# Patient Record
Sex: MALE | Race: WHITE
[De-identification: names, ages, dates, MRNs, and addresses within clinical notes are randomized per-mention and may not be internally consistent; named-entity substitution may affect disease eponyms.]

---

## 2021-08-19 ENCOUNTER — HOSPITAL ENCOUNTER (OUTPATIENT)
Dept: HOSPITAL 47 - LABWHC1 | Age: 61
Discharge: HOME | End: 2021-08-19
Attending: INTERNAL MEDICINE
Payer: COMMERCIAL

## 2021-08-19 DIAGNOSIS — E78.2: Primary | ICD-10-CM

## 2021-08-19 LAB
ALT SERPL-CCNC: 31 U/L (ref 10–49)
AST SERPL-CCNC: 63 U/L (ref 14–35)
CHOLEST SERPL-MCNC: 214 MG/DL (ref 0–200)
HDLC SERPL-MCNC: 79 MG/DL (ref 40–60)
LDLC SERPL CALC-MCNC: 116.4 MG/DL (ref 0–131)
TRIGL SERPL-MCNC: 93 MG/DL (ref 0–149)
VLDLC SERPL CALC-MCNC: 18.6 MG/DL (ref 5–40)

## 2021-08-19 PROCEDURE — 80061 LIPID PANEL: CPT

## 2021-08-19 PROCEDURE — 84460 ALANINE AMINO (ALT) (SGPT): CPT

## 2021-08-19 PROCEDURE — 36415 COLL VENOUS BLD VENIPUNCTURE: CPT

## 2021-08-19 PROCEDURE — 84450 TRANSFERASE (AST) (SGOT): CPT

## 2022-09-13 ENCOUNTER — HOSPITAL ENCOUNTER (INPATIENT)
Dept: HOSPITAL 47 - EC | Age: 62
LOS: 3 days | Discharge: HOME | DRG: 280 | End: 2022-09-16
Attending: HOSPITALIST | Admitting: HOSPITALIST
Payer: COMMERCIAL

## 2022-09-13 DIAGNOSIS — I47.2: ICD-10-CM

## 2022-09-13 DIAGNOSIS — Z95.1: ICD-10-CM

## 2022-09-13 DIAGNOSIS — I25.5: ICD-10-CM

## 2022-09-13 DIAGNOSIS — Z86.74: ICD-10-CM

## 2022-09-13 DIAGNOSIS — I25.10: ICD-10-CM

## 2022-09-13 DIAGNOSIS — I25.2: ICD-10-CM

## 2022-09-13 DIAGNOSIS — Z79.899: ICD-10-CM

## 2022-09-13 DIAGNOSIS — I49.01: ICD-10-CM

## 2022-09-13 DIAGNOSIS — Z98.890: ICD-10-CM

## 2022-09-13 DIAGNOSIS — I50.22: ICD-10-CM

## 2022-09-13 DIAGNOSIS — J44.9: ICD-10-CM

## 2022-09-13 DIAGNOSIS — I11.0: ICD-10-CM

## 2022-09-13 DIAGNOSIS — I21.4: Primary | ICD-10-CM

## 2022-09-13 DIAGNOSIS — E78.5: ICD-10-CM

## 2022-09-13 DIAGNOSIS — I49.3: ICD-10-CM

## 2022-09-13 DIAGNOSIS — Z95.810: ICD-10-CM

## 2022-09-13 DIAGNOSIS — F17.210: ICD-10-CM

## 2022-09-13 DIAGNOSIS — I08.1: ICD-10-CM

## 2022-09-13 LAB
ALBUMIN SERPL-MCNC: 4.4 G/DL (ref 3.5–5)
ALP SERPL-CCNC: 39 U/L (ref 38–126)
ALT SERPL-CCNC: 22 U/L (ref 4–49)
ANION GAP SERPL CALC-SCNC: 12 MMOL/L
APTT BLD: 22.2 SEC (ref 22–30)
AST SERPL-CCNC: 47 U/L (ref 17–59)
BASOPHILS # BLD AUTO: 0.1 K/UL (ref 0–0.2)
BASOPHILS NFR BLD AUTO: 1 %
BUN SERPL-SCNC: 11 MG/DL (ref 9–20)
CALCIUM SPEC-MCNC: 9.6 MG/DL (ref 8.4–10.2)
CHLORIDE SERPL-SCNC: 105 MMOL/L (ref 98–107)
CO2 SERPL-SCNC: 24 MMOL/L (ref 22–30)
EOSINOPHIL # BLD AUTO: 0.2 K/UL (ref 0–0.7)
EOSINOPHIL NFR BLD AUTO: 3 %
ERYTHROCYTE [DISTWIDTH] IN BLOOD BY AUTOMATED COUNT: 4.8 M/UL (ref 4.3–5.9)
ERYTHROCYTE [DISTWIDTH] IN BLOOD: 12.9 % (ref 11.5–15.5)
GLUCOSE SERPL-MCNC: 97 MG/DL (ref 74–99)
HCT VFR BLD AUTO: 50.7 % (ref 39–53)
HGB BLD-MCNC: 16.6 GM/DL (ref 13–17.5)
HYALINE CASTS UR QL AUTO: 1 /LPF (ref 0–2)
INR PPP: 1 (ref ?–1.2)
KETONES UR QL STRIP.AUTO: (no result)
LYMPHOCYTES # SPEC AUTO: 1.6 K/UL (ref 1–4.8)
LYMPHOCYTES NFR SPEC AUTO: 21 %
MAGNESIUM SPEC-SCNC: 1.7 MG/DL (ref 1.6–2.3)
MCH RBC QN AUTO: 34.7 PG (ref 25–35)
MCHC RBC AUTO-ENTMCNC: 32.8 G/DL (ref 31–37)
MCV RBC AUTO: 105.7 FL (ref 80–100)
MONOCYTES # BLD AUTO: 0.4 K/UL (ref 0–1)
MONOCYTES NFR BLD AUTO: 6 %
NEUTROPHILS # BLD AUTO: 5.2 K/UL (ref 1.3–7.7)
NEUTROPHILS NFR BLD AUTO: 68 %
PH UR: 6.5 [PH] (ref 5–8)
PLATELET # BLD AUTO: 241 K/UL (ref 150–450)
POTASSIUM SERPL-SCNC: 4.1 MMOL/L (ref 3.5–5.1)
PROT SERPL-MCNC: 6.8 G/DL (ref 6.3–8.2)
PROT UR QL: (no result)
PT BLD: 11 SEC (ref 9–12)
RBC UR QL: 1 /HPF (ref 0–5)
SODIUM SERPL-SCNC: 141 MMOL/L (ref 137–145)
SP GR UR: 1.02 (ref 1–1.03)
UROBILINOGEN UR QL STRIP: <2 MG/DL (ref ?–2)
WBC # BLD AUTO: 7.6 K/UL (ref 3.8–10.6)
WBC #/AREA URNS HPF: 3 /HPF (ref 0–5)

## 2022-09-13 PROCEDURE — 85025 COMPLETE CBC W/AUTO DIFF WBC: CPT

## 2022-09-13 PROCEDURE — 36415 COLL VENOUS BLD VENIPUNCTURE: CPT

## 2022-09-13 PROCEDURE — 96368 THER/DIAG CONCURRENT INF: CPT

## 2022-09-13 PROCEDURE — 80048 BASIC METABOLIC PNL TOTAL CA: CPT

## 2022-09-13 PROCEDURE — 71045 X-RAY EXAM CHEST 1 VIEW: CPT

## 2022-09-13 PROCEDURE — 96375 TX/PRO/DX INJ NEW DRUG ADDON: CPT

## 2022-09-13 PROCEDURE — 81001 URINALYSIS AUTO W/SCOPE: CPT

## 2022-09-13 PROCEDURE — 83735 ASSAY OF MAGNESIUM: CPT

## 2022-09-13 PROCEDURE — 84443 ASSAY THYROID STIM HORMONE: CPT

## 2022-09-13 PROCEDURE — 93306 TTE W/DOPPLER COMPLETE: CPT

## 2022-09-13 PROCEDURE — 85610 PROTHROMBIN TIME: CPT

## 2022-09-13 PROCEDURE — 96365 THER/PROPH/DIAG IV INF INIT: CPT

## 2022-09-13 PROCEDURE — 80061 LIPID PANEL: CPT

## 2022-09-13 PROCEDURE — 99285 EMERGENCY DEPT VISIT HI MDM: CPT

## 2022-09-13 PROCEDURE — 80053 COMPREHEN METABOLIC PANEL: CPT

## 2022-09-13 PROCEDURE — 96366 THER/PROPH/DIAG IV INF ADDON: CPT

## 2022-09-13 PROCEDURE — 93459 L HRT ART/GRFT ANGIO: CPT

## 2022-09-13 PROCEDURE — 85730 THROMBOPLASTIN TIME PARTIAL: CPT

## 2022-09-13 PROCEDURE — 84484 ASSAY OF TROPONIN QUANT: CPT

## 2022-09-13 PROCEDURE — 93005 ELECTROCARDIOGRAM TRACING: CPT

## 2022-09-13 RX ADMIN — HEPARIN SODIUM SCH MLS/HR: 10000 INJECTION, SOLUTION INTRAVENOUS at 21:03

## 2022-09-13 RX ADMIN — CEFAZOLIN SCH MLS/HR: 330 INJECTION, POWDER, FOR SOLUTION INTRAMUSCULAR; INTRAVENOUS at 21:07

## 2022-09-13 NOTE — ED
Chest Pain HPI





- General


Stated Complaint: V-Tach


Time Seen by Provider: 09/13/22 16:39





- History of Present Illness


Initial Comments: 





This patient is a 62-year-old man who arrives here by ambulance to have 

evaluation for his defibrillator going off.  The patient states it has shocked 

him total of 7 times today.  Patient reports that he was doing reasonably well 

prior to the start of that.  He states that he did have a little bit of 

lightheadedness earlier in the day with activity.  He was not having chest 

pains, dyspnea, diaphoresis, nausea or vomiting.  As the defibrillator Going off

he called EMS and they brought him here.  He does state that he ran out of his 

medications approximately 14-15 days ago so he has not taken his cardiac 

medication.  Patient has history of vessel CABG in approximately 2004 he 

believes in [place].  Currently smoking approximately one pack per day


MD Complaint: chest pain


-: hour(s)


Onset: during rest


Pain Location: substernal


Pain Radiation: none


Severity: severe


Quality: sharp


Consistency: intermittent


Improves With: nothing


Worsens With: nothing


Treatments Prior to Arrival: none





- Related Data


                                Home Medications











 Medication  Instructions  Recorded  Confirmed


 


Atorvastatin [Lipitor] 40 mg PO DAILY 09/13/22 09/13/22


 


Metoprolol Succinate (ER) [Toprol 25 mg PO DAILY 09/13/22 09/13/22





Xl]   


 


Omeprazole [PriLOSEC] 40 mg PO DAILY 09/13/22 09/13/22











                                    Allergies











Allergy/AdvReac Type Severity Reaction Status Date / Time


 


No Known Allergies Allergy   Verified 09/13/22 18:31














Review of Systems


ROS Statement: 


Those systems with pertinent positive or pertinent negative responses have been 

documented in the HPI.





ROS Other: All systems not noted in ROS Statement are negative.


Constitutional: Denies: fever, chills


Respiratory: Denies: cough, dyspnea


Cardiovascular: Reports: as per HPI, chest pain.  Denies: palpitations, 

orthopnea, edema, syncope


Gastrointestinal: Denies: abdominal pain, nausea, vomiting, melena, hematochezia


Genitourinary: Denies: dysuria, hematuria


Musculoskeletal: Denies: back pain


Skin: Denies: rash


Neurological: Denies: headache, weakness





EKG Findings





- EKG Results:


EKG: sinus rhythm (With frequent PVCs, rate 70 bpm)





- MI, Pacemaker, Normal:


Myocardial infarction: inferior MI (old age indeterminate)





General Exam


General appearance: alert, in no apparent distress


Head exam: Present: atraumatic, normocephalic


Eye exam: Present: normal appearance.  Absent: scleral icterus, conjunctival 

injection


Neck exam: Present: normal inspection


Respiratory exam: Present: normal lung sounds bilaterally.  Absent: respiratory 

distress, wheezes, rales, rhonchi, stridor


Cardiovascular Exam: Present: regular rate, normal rhythm, normal heart sounds. 

 Absent: systolic murmur, diastolic murmur, rubs, gallop


GI/Abdominal exam: Present: soft.  Absent: distended, tenderness, guarding, 

rebound


Extremities exam: Present: normal inspection, normal capillary refill.  Absent: 

pedal edema, calf tenderness


Back exam: Present: normal inspection.  Absent: CVA tenderness (R), CVA 

tenderness (L)


Neurological exam: Present: alert


Skin exam: Present: warm, dry, intact, normal color.  Absent: rash





Course


                                   Vital Signs











  09/13/22 09/13/22 09/13/22





  16:39 16:44 17:00


 


Temperature 97.6 F  


 


Pulse Rate 60 77 63


 


Pulse Rate [   





Pulse Oximetery   





]   


 


Respiratory 18 18 18





Rate   


 


Blood Pressure 120/84 120/84 136/84


 


Blood Pressure   





[Right Arm]   


 


O2 Sat by Pulse 92 L 92 L 95





Oximetry   














  09/13/22 09/13/22 09/13/22





  17:10 17:30 21:07


 


Temperature   


 


Pulse Rate 65 59 L 62


 


Pulse Rate [   





Pulse Oximetery   





]   


 


Respiratory 18 18 15





Rate   


 


Blood Pressure 112/81 116/80 127/65


 


Blood Pressure   





[Right Arm]   


 


O2 Sat by Pulse 94 L  98





Oximetry   














  09/13/22 09/13/22





  22:20 22:50


 


Temperature 98.1 F 


 


Pulse Rate  


 


Pulse Rate [ 47 L 49 L





Pulse Oximetery  





]  


 


Respiratory 15 





Rate  


 


Blood Pressure  


 


Blood Pressure 134/67 





[Right Arm]  


 


O2 Sat by Pulse 97 





Oximetry  














Disposition


Clinical Impression: 


 Ventricular tachycardia, Defibrillator discharge





Disposition: ADMITTED AS IP TO THIS HOSP


Condition: Serious


Is patient prescribed a controlled substance at d/c from ED?: No

## 2022-09-13 NOTE — XR
EXAMINATION TYPE: XR chest 1V portable

 

DATE OF EXAM: 9/13/2022

 

COMPARISON: NONE

 

HISTORY: Dysrhythmia

 

TECHNIQUE: Angled

 

FINDINGS: Heart size is normal. There are sternal wires. There is slight elevated left diaphragm. The
re is left axillary pacemaker. There are no hilar masses. There is some spurring at the left shoulder
 joint. There is no heart failure.

 

IMPRESSION: There is mild elevation of the left diaphragm and minimal subsegmental atelectasis left l
[place] base. No heart failure seen. There is COPD.

## 2022-09-14 LAB
BASOPHILS # BLD AUTO: 0.1 K/UL (ref 0–0.2)
BASOPHILS NFR BLD AUTO: 1 %
CHOLEST SERPL-MCNC: 182 MG/DL (ref 0–200)
EOSINOPHIL # BLD AUTO: 0.2 K/UL (ref 0–0.7)
EOSINOPHIL NFR BLD AUTO: 3 %
ERYTHROCYTE [DISTWIDTH] IN BLOOD BY AUTOMATED COUNT: 4.65 M/UL (ref 4.3–5.9)
ERYTHROCYTE [DISTWIDTH] IN BLOOD: 12.7 % (ref 11.5–15.5)
HCT VFR BLD AUTO: 49.4 % (ref 39–53)
HDLC SERPL-MCNC: 46.9 MG/DL (ref 40–60)
HGB BLD-MCNC: 16.2 GM/DL (ref 13–17.5)
INR PPP: 0.9 (ref ?–1.2)
LDLC SERPL CALC-MCNC: 107.9 MG/DL (ref 0–131)
LYMPHOCYTES # SPEC AUTO: 1.7 K/UL (ref 1–4.8)
LYMPHOCYTES NFR SPEC AUTO: 22 %
MCH RBC QN AUTO: 34.8 PG (ref 25–35)
MCHC RBC AUTO-ENTMCNC: 32.7 G/DL (ref 31–37)
MCV RBC AUTO: 106.3 FL (ref 80–100)
MONOCYTES # BLD AUTO: 0.5 K/UL (ref 0–1)
MONOCYTES NFR BLD AUTO: 7 %
NEUTROPHILS # BLD AUTO: 5.1 K/UL (ref 1.3–7.7)
NEUTROPHILS NFR BLD AUTO: 66 %
PLATELET # BLD AUTO: 221 K/UL (ref 150–450)
PT BLD: 10.4 SEC (ref 9–12)
TRIGL SERPL-MCNC: 136 MG/DL (ref 0–149)
VLDLC SERPL CALC-MCNC: 27.2 MG/DL (ref 5–40)
WBC # BLD AUTO: 7.8 K/UL (ref 3.8–10.6)

## 2022-09-14 RX ADMIN — CEFAZOLIN SCH MLS/HR: 330 INJECTION, POWDER, FOR SOLUTION INTRAMUSCULAR; INTRAVENOUS at 17:00

## 2022-09-14 RX ADMIN — PANTOPRAZOLE SODIUM SCH MG: 40 TABLET, DELAYED RELEASE ORAL at 12:37

## 2022-09-14 RX ADMIN — CEFAZOLIN SCH MLS/HR: 330 INJECTION, POWDER, FOR SOLUTION INTRAMUSCULAR; INTRAVENOUS at 03:37

## 2022-09-14 RX ADMIN — ATORVASTATIN CALCIUM SCH MG: 80 TABLET, FILM COATED ORAL at 09:43

## 2022-09-14 RX ADMIN — METOPROLOL SUCCINATE SCH MG: 25 TABLET, EXTENDED RELEASE ORAL at 12:37

## 2022-09-14 NOTE — CA
Transthoracic Echo Report 

 Name: Eduardo Mueller 

 MRN:    [id number] 

 Age:    62     Gender:     M 

 

 :    1960 

 Exam Date:     2022 09:04 

 Exam Location: [facility] 

 Ht (in):     66     Wt (lb):     145 

 Ordering Physician:        Aurora Levi 

 Attending/Referring Phys: 

 Technician         Rita Thomas RDCS 

 Procedure CPT: 

 Indications:       elevated troponin, Defib shocks, NSTEMI 

 

 Cardiac Hx: 

 Technical Quality:      Good 

 Contrast 1:                                Total Dose (mL): 

 Contrast 2:                                Total Dose (mL): 

 

 MEASUREMENTS  (Male / Female) Normal Values 

 2D ECHO 

 LV Diastolic Diameter PLAX        5.7 cm                4.2 - 5.9 / 3.9 - 5.3 cm 

 LV Systolic Diameter PLAX         5.1 cm                 

 IVS Diastolic Thickness           1.1 cm                0.6 - 1.0 / 0.6 - 0.9 cm 

 LVPW Diastolic Thickness          1.0 cm                0.6 - 1.0 / 0.6 - 0.9 cm 

 LV Relative Wall Thickness        0.4                    

 RV Internal Dim ED PLAX           2.9 cm                 

 LA Systolic Diameter LX           4.2 cm                3.0 - 4.0 / 2.7 - 3.8 cm 

 LA Volume                         69.1 cm???              18 - 58 / 22 - 52 cm??? 

 

 M-MODE 

 Aortic Root Diameter MM           3.3 cm                 

 MV E Point Septal Separation      1.6 cm                 

 AV Cusp Separation MM             1.9 cm                 

 

 DOPPLER 

 AV Peak Velocity                  143.2 cm/s             

 AV Peak Gradient                  8.2 mmHg               

 MV Area PHT                       1.9 cm???                

 Mitral E Point Velocity           48.6 cm/s              

 Mitral A Point Velocity           35.7 cm/s              

 Mitral E to A Ratio               1.4                    

 MV Deceleration Time              397.0 ms               

 MV E' Velocity                    6.2 cm/s               

 Mitral E to MV E' Ratio           7.8                    

 TR Peak Velocity                  262.9 cm/s             

 TR Peak Gradient                  27.6 mmHg              

 Right Ventricular Systolic Press  31.4 mmHg              

 

 

 FINDINGS 

 Left Ventricle 

 Left ventricular ejection fraction is estimated at 20-25 %. Left ventricular  

 cavity size normal. Left ventricular wall thickness normal. Moderately reduced  

 global left ventricular systolic function. Inferior basel wall and basel infero  

 septal wall hypokinesis 

 

 Right Ventricle 

 Normal right ventricular size and function. 

 

 Right Atrium 

 Normal right atrial size. 

 

 Left Atrium 

 Mildly increased left atrial diameter. Moderately increased left atrial volume.  

 No evidence for an atrial septal defect. 

 

 Mitral Valve 

 Mitral valve thickened. Trace to mild mitral regurgitation. 

 

 Aortic Valve 

 Trileaflet aortic valve. No aortic valve stenosis or regurgitation. 

 

 Tricuspid Valve 

 Mild tricuspid regurgitation. 

 

 Pulmonic Valve 

 Structurally normal pulmonic valve. 

 

 Pericardium 

 Normal pericardium. No pericardial effusion. 

 

 Aorta 

 Normal size aortic root and proximal ascending aorta. 

 

 CONCLUSIONS 

 Dilated left ventricle with severe LV dysfunction 

 Previewed by:  

 Dr. Luis Cox MD 

 (Electronically Signed) 

 Final Date:      2022 12:09

## 2022-09-14 NOTE — P.HPIM
History of Present Illness


H&P Date: 09/14/22


Chief Complaint: AICD firing


 History of present illness;


Patient is a pleasant 62-year-old male past medical history significant for 

coronary artery disease s/p CABG in 2006 (LIMA to LAD, VG to obtuse marginal 

branch of the LCx, VG to distal RCA/PDA), V fib arrest s/p ICD implantation in 

2020, hypertension, dyslipidemia, ischemic cardiomyopathy, congestive heart 

failure who presented to the ER because of multiple AICD shocks since yesterday.

 Patient was in usual state of health yesterday morning when he noticed that his

AICD fired after breakfast, it was followed by multiple frequent shocks.  During

these episodes patient did not have any lightheadedness or dizziness, denied any

chest pain or shortness of breath.  In total he had 5 shocks followed by another

2 shocks in the afternoon.  At that time his friend called EMS and he was todd

t to the ER of [facility].  Initial troponin was elevated at 3.060, 

rest of blood work was normal, chest x-ray did not show any acute cardiac 

process.  Patient was started on heparin and was admitted to hospitalist service

with cardiology consult.








REVIEW OF SYSTEMS: 


CONSTITUTIONAL: No fever, no malaise, no fatigue. 


HEENT: No recent visual problems or hearing problems. Denied any sore throat. 


CARDIOVASCULAR: No chest pain, orthopnea, PND, no palpitations, no syncope. 


PULMONARY: No shortness of breath, no cough, no hemoptysis. 


GASTROINTESTINAL: No diarrhea, no nausea, no vomiting, no abdominal pain. 


NEUROLOGICAL: No headaches, no weakness, no numbness. 


HEMATOLOGICAL: Denies any bleeding or petechiae. 


GENITOURINARY: Denies any burning micturition, frequency, or urgency. 


MUSCULOSKELETAL/RHEUMATOLOGICAL: Denies any joint pain, swelling, or any muscle 

pain. 


ENDOCRINE: Denies any polyuria or polydipsia. 





The rest of the 14-point review of systems is negative.











PHYSICAL EXAMINATION: 





GENERAL: The patient is alert and oriented x3, not in any acute distress. Well 

developed, well nourished. 


HEENT: Pupils are round and equally reacting to light. EOMI. No scleral icterus.

No conjunctival pallor. Normocephalic, atraumatic. No pharyngeal erythema. No 

thyromegaly. 


CARDIOVASCULAR: S1 and S2 present. No murmurs, rubs, or gallops. 


PULMONARY: Chest is clear to auscultation, no wheezing or crackles. 


ABDOMEN: Soft, nontender, nondistended, normoactive bowel sounds. No palpable 

organomegaly. 


MUSCULOSKELETAL: No joint swelling or deformity.


EXTREMITIES: No cyanosis, clubbing, or pedal edema. 


NEUROLOGICAL: Gross neurological examination did not reveal any focal deficits. 


SKIN: No rashes. 





Assessment 


Multiple AICD shocks


NSTEMI


Coronary artery disease s/p CABG in 2006 (LIMA to LAD, VG to obtuse marginal 

branch of the LCx, VG to distal RCA/PDA)


History of V fib arrest s/p ICD implantation in 2020


Hypertension


Dyslipidemia


Ischemic cardiomyopathy


Chronic congestive heart failure with reduced ejection fraction 





Plan;


Monitor electrolytes.


Moderate renal functions


Continue to trend troponins.


Continue pharmacy to dose heparin


2-D echo ordered


Resume home meds


Cardiology consulted, they evaluated the patient, plan is for the patient to 

undergo cardiac cath tomorrow, nothing by mouth after midnight


Continue aspirin, Lipitor, Toprol


AICD interrogation ordered





DVT prophylaxis: Pharmacy dose heparin


Full code








Past Medical History


Past Medical History: Coronary Artery Disease (CAD), Hyperlipidemia, 

Hypertension, Myocardial Infarction (MI)


Last Myocardial Infarction Date:: 2020


History of Any Multi-Drug Resistant Organisms: None Reported


Past Surgical History: AICD, Coronary Bypass/CABG, Hernia Repair, Pacemaker


Additional Past Surgical History / Comment(s): triple bypass


Past Anesthesia/Blood Transfusion Reactions: No Reported Reaction


Type of Cardiac Device: Permanent Pacemaker, AICD


Device Placement Date:: 2020


Past Psychological History: No Psychological Hx Reported


Smoking Status: Current every day smoker


Past Alcohol Use History: None Reported


Past Drug Use History: None Reported





Medications and Allergies


                                Home Medications











 Medication  Instructions  Recorded  Confirmed  Type


 


Atorvastatin [Lipitor] 40 mg PO DAILY 09/13/22 09/13/22 History


 


Metoprolol Succinate (ER) [Toprol 25 mg PO DAILY 09/13/22 09/13/22 History





Xl]    


 


Omeprazole [PriLOSEC] 40 mg PO DAILY 09/13/22 09/13/22 History








                                    Allergies











Allergy/AdvReac Type Severity Reaction Status Date / Time


 


No Known Allergies Allergy   Verified 09/13/22 18:31














Physical Exam


Vitals: 


                                   Vital Signs











  Temp Pulse Pulse Resp BP BP Pulse Ox


 


 09/14/22 08:14    44 L    


 


 09/14/22 08:10    47 L    


 


 09/14/22 07:49  97.7 F   53 L  16   118/67 


 


 09/14/22 04:00     18   114/78  96


 


 09/14/22 01:55    48 L    


 


 09/13/22 23:10  97.9 F   51 L  16   111/74  98


 


 09/13/22 22:50    49 L    


 


 09/13/22 22:20  98.1 F   47 L  15   134/67  97


 


 09/13/22 21:07   62   15  127/65   98


 


 09/13/22 17:30   59 L   18  116/80  


 


 09/13/22 17:10   65   18  112/81   94 L


 


 09/13/22 17:00   63   18  136/84   95


 


 09/13/22 16:44   77   18  120/84   92 L


 


 09/13/22 16:39  97.6 F  60   18  120/84   92 L








                                Intake and Output











 09/13/22 09/14/22 09/14/22





 22:59 06:59 14:59


 


Intake Total  222.358 


 


Balance  222.358 


 


Intake:   


 


  Intake, IV Titration  222.358 





  Amount   


 


    Heparin Sod,Pork in 0.45%  47.358 





    NaCl 25,000 unit In 0.45   





    % NaCl 1 250ml.bag @ 12   





    UNITS/KG/HR 7.893 mls/hr   





    IV .Q24H formerly [facility] Rx#:   





    [id number]   


 


    Magnesium Sulfate-D5w Pmx  100 





    1 gm In Dextrose/Water 1   





    100ml.bag @ 100 mls/hr   





    IVPB ONCE ONE Rx#:   





    [id number]   


 


    Sodium Chloride 0.9% 1,  75 





    000 ml @ 75 mls/hr IV .   





    [id number] formerly [facility] Rx#:[id number]   


 


  Oral  0 


 


Other:   


 


  Voiding Method   Toilet


 


  # Voids  1 


 


  Weight 65.771 kg  














Results


CBC & Chem 7: 


                                 09/14/22 07:31





                                 09/13/22 16:52


Labs: 


                  Abnormal Lab Results - Last 24 Hours (Table)











  09/13/22 09/13/22 09/13/22 Range/Units





  16:52 16:52 16:52 


 


MCV  105.7 H    (80.0-100.0)  fL


 


APTT     (22.0-30.0)  sec


 


Troponin I    3.060 H*  (0.000-0.034)  ng/mL


 


Urine Protein   1+ H   (Negative)  


 


Urine Ketones   1+ H   (Negative)  


 


Ur Leukocyte Esterase   Trace H   (Negative)  


 


Urine Mucus   Occasional H   (None)  /hpf














  09/13/22 09/14/22 09/14/22 Range/Units





  21:10 00:45 00:45 


 


MCV     (80.0-100.0)  fL


 


APTT    44.4 H  (22.0-30.0)  sec


 


Troponin I  15.500 H*  13.900 H*   (0.000-0.034)  ng/mL


 


Urine Protein     (Negative)  


 


Urine Ketones     (Negative)  


 


Ur Leukocyte Esterase     (Negative)  


 


Urine Mucus     (None)  /hpf














  09/14/22 Range/Units





  07:31 


 


MCV  106.3 H  (80.0-100.0)  fL


 


APTT   (22.0-30.0)  sec


 


Troponin I   (0.000-0.034)  ng/mL


 


Urine Protein   (Negative)  


 


Urine Ketones   (Negative)  


 


Ur Leukocyte Esterase   (Negative)  


 


Urine Mucus   (None)  /hpf














Thrombosis Risk Factor Assmnt





- Choose All That Apply


Any of the Below Risk Factors Present?: No


Each Risk Factor Represents 2 Points: Age 61-74 years


Other congenital or acquired thrombophilia - If yes, enter type in comment: No


Thrombosis Risk Factor Assessment Total Risk Factor Score: 2


Thrombosis Risk Factor Assessment Level: Low Risk

## 2022-09-14 NOTE — P.CRDCN
History of Present Illness


History of present illness: 


HISTORY OF PRESENTING ILLNESS


This is a pleasant 62-year-old male past medical history significant for 

coronary artery disease s/p CABG in 2006 (LIMA to LAD, VG to obtuse marginal 

branch of the LCx, VG to distal RCA/PDA), V fib arrest s/p ICD implantation in 

2020, hypertension, dyslipidemia, ischemic cardiomyopathy, congestive heart 

failure .  He follows in the office with Dr. Clayton. We have been asked to see in

consultation for tachyarrhythmia. Patient presents emergency department after 

multiple ICD shocks. Patient states he is moving to [place] and was packing at

home. He was sitting down and all of a sudden felt shock in his chest.  He did 

endorse some lightheadedness in the morning.  Prior to the shock he denies any 

chest pain, palpitations, shortness of breath, lightheadedness or dizziness. He 

states he felt his ICD shock him multiple times. He denies any recent changes to

his medications or medical history. He continues to smoke 1PPD. 





DIAGNOSTICS


* EKG reveals sinus rhythm, heart rate 70, PVCs, T-wave inversions in inferior 

  leads.  Prior EKG in the office with similar findings.


* Telemetry tracings indicate sinus rhythm with episodes of nonsustained 

  ventricular tachycardia.


* Chest xray COPD, mild elevation of the left diaphragm.


* Most recent echocardiogram in the office here 9/2021 with EF of 25%, inferior 

  MI, severely dilated left atrium, moderate mitral regurgitation, moderate 

  tricuspid regurgitation


* Laboratory reviewed, CBC unremarkable, troponin 3, 15, 13.9, sodium 141, 

  potassium 4.1, BUN 11, serum creatinine 0.9


* Cardiac catheterization 03/2020 revealed patent MONTGOMERY to LAD, patent vein graft

  obtuse marginal branch and left circumflex artery and patent vein graft to 

  distal RCA/PDA, severely reduced left ventricular systolic function with an EF

  of 30% stenosis with akinesis of the basal.  Segment.  Mild distal stenosis of

  the left main coronary artery 2030% stenosis


* Current home cardiac medications include metoprolol succinate 20 mg daily, mario

  rvastatin 40 mg daily. 





REVIEW OF SYSTEMS


At the time of my exam:


CONSTITUTIONAL: Denies fever or chills.


CARDIOVASCULAR: Denies chest pain, shortness of breath, orthopnea, PND or 

palpitations.


RESPIRATORY: Denies cough. 


GASTROINTESTINAL: Denies abdominal pain, diarrhea, constipation, nausea or vo

miting.


MUSCULOSKELETAL: Denies myalgias.


NEUROLOGIC: Denies numbness, tingling, headacbe or weakness.


ENDOCRINE: Denies fatigue, weight change,  polydipsia or polyurina.


GENITOURINARY: Denies burning, hematuria or urgency with micturation.


HEMATOLOGIC: Denies history of anemia or bleeding. 





PHYSICAL EXAMINATION


Blood pressure 118/67, heart rate 53, afebrile, oxygen saturation 96% on 2 L 

nasal cannula


CONSTITUTIONAL: No apparent distress. 


HEENT: Head is normocephalic. Pupils are equal, round. Sclerae anicteric. Mucous

membranes of the mouth are moist.  No JVD. No carotid bruit.


CHEST EXAMINATION: Lungs are clear to auscultation. No chest wall tenderness is 

noted on palpation or with deep breathing. 


HEART EXAMINATION: Regular rate and rhythm. S1, S2 heard. Systolic murmur at 

base.


ABDOMEN: Soft, nontender. Positive bowel sounds.


EXTREMITIES: 2+ peripheral pulses, no lower extremity edema and no calf 

tenderness.


NEUROLOGIC EXAMINATION: Patient is awake, alert and oriented x3. 





ASSESSMENT


Multiple ICD shocks


NSVT


NSTEMI


Coronary artery disease s/p CABG in 2006 (LIMA to LAD, VG to obtuse marginal 

branch of the LCx, VG to distal RCA/PDA)


History of V fib arrest s/p ICD implantation in 2020


Hypertension


Dyslipidemia


Ischemic cardiomyopathy


Chronic congestive heart failure with reduced ejection fraction 





PLAN


Interrogate patient's device


IV  heparin


Aspirin, statin, beta blocker


Obtain 2D echocardiogram and doppler study to assess cardiac structure and 

function. 


NPO after midnight for possible cardiac catheterization tomorrow. 


I have discussed the risks, benefits and alternative therapies for the above-

mentioned procedure and for both sedation/analgesia as well as necessary blood 

product administration, if indicated, as they pertain to this patient.  The 

patient has indicated understanding and acceptance of the risks and procedures 

discussed.  Questions have been answered appropriately and he is agreeable to 

move forward with the above-stated procedure. 


Further recommendations and clinical course





Nurse practitioner note has been reviewed by physician. Signing provider agrees 

with the documented findings, assessment, and plan of care. 














Past Medical History


Past Medical History: Coronary Artery Disease (CAD), Hyperlipidemia, 

Hypertension, Myocardial Infarction (MI)


Last Myocardial Infarction Date:: 2020


History of Any Multi-Drug Resistant Organisms: None Reported


Past Surgical History: AICD, Coronary Bypass/CABG, Hernia Repair, Pacemaker


Additional Past Surgical History / Comment(s): triple bypass


Past Anesthesia/Blood Transfusion Reactions: No Reported Reaction


Type of Cardiac Device: Permanent Pacemaker, AICD


Device Placement Date:: 2020


Past Psychological History: No Psychological Hx Reported


Smoking Status: Current every day smoker


Past Alcohol Use History: None Reported


Past Drug Use History: None Reported





Medications and Allergies


                                Home Medications











 Medication  Instructions  Recorded  Confirmed  Type


 


Atorvastatin [Lipitor] 40 mg PO DAILY 09/13/22 09/13/22 History


 


Metoprolol Succinate (ER) [Toprol 25 mg PO DAILY 09/13/22 09/13/22 History





Xl]    


 


Omeprazole [PriLOSEC] 40 mg PO DAILY 09/13/22 09/13/22 History








                                    Allergies











Allergy/AdvReac Type Severity Reaction Status Date / Time


 


No Known Allergies Allergy   Verified 09/13/22 18:31














Physical Exam


Vitals: 


                                   Vital Signs











  Temp Pulse Pulse Resp BP BP Pulse Ox


 


 09/14/22 04:00     18   114/78  96


 


 09/14/22 01:55    48 L    


 


 09/13/22 23:10  97.9 F   51 L  16   111/74  98


 


 09/13/22 22:50    49 L    


 


 09/13/22 22:20  98.1 F   47 L  15   134/67  97


 


 09/13/22 21:07   62   15  127/65   98


 


 09/13/22 17:30   59 L   18  116/80  


 


 09/13/22 17:10   65   18  112/81   94 L


 


 09/13/22 17:00   63   18  136/84   95


 


 09/13/22 16:44   77   18  120/84   92 L


 


 09/13/22 16:39  97.6 F  60   18  120/84   92 L








                                Intake and Output











 09/13/22 09/14/22 09/14/22





 22:59 06:59 14:59


 


Intake Total  222.358 


 


Balance  222.358 


 


Intake:   


 


  Intake, IV Titration  222.358 





  Amount   


 


    Heparin Sod,Pork in 0.45%  47.358 





    NaCl 25,000 unit In 0.45   





    % NaCl 1 250ml.bag @ 12   





    UNITS/KG/HR 7.893 mls/hr   





    IV .Q24H [facility] Rx#:   





    [id number]   


 


    Magnesium Sulfate-D5w Pmx  100 





    1 gm In Dextrose/Water 1   





    100ml.bag @ 100 mls/hr   





    IVPB ONCE ONE Rx#:   





    [id number]   


 


    Sodium Chloride 0.9% 1,  75 





    000 ml @ 75 mls/hr IV .   





    [id number] [facility] Rx#:[id number]   


 


  Oral  0 


 


Other:   


 


  # Voids  1 


 


  Weight 65.771 kg  














Results





                                 09/14/22 07:31





                                 09/13/22 16:52


                                 Cardiac Enzymes











  09/13/22 09/13/22 09/13/22 Range/Units





  16:52 16:52 21:10 


 


AST  47    (17-59)  U/L


 


Troponin I   3.060 H*  15.500 H*  (0.000-0.034)  ng/mL














  09/14/22 Range/Units





  00:45 


 


AST   (17-59)  U/L


 


Troponin I  13.900 H*  (0.000-0.034)  ng/mL








                                   Coagulation











  09/13/22 09/14/22 Range/Units





  16:52 00:45 


 


PT  11.0   (9.0-12.0)  sec


 


APTT  22.2  44.4 H  (22.0-30.0)  sec








                                       CBC











  09/13/22 Range/Units





  16:52 


 


WBC  7.6  (3.8-10.6)  k/uL


 


RBC  4.80  (4.30-5.90)  m/uL


 


Hgb  16.6  (13.0-17.5)  gm/dL


 


Hct  50.7  (39.0-53.0)  %


 


Plt Count  241  (150-450)  k/uL








                          Comprehensive Metabolic Panel











  09/13/22 Range/Units





  16:52 


 


Sodium  141  (137-145)  mmol/L


 


Potassium  4.1  (3.5-5.1)  mmol/L


 


Chloride  105  ()  mmol/L


 


Carbon Dioxide  24  (22-30)  mmol/L


 


BUN  11  (9-20)  mg/dL


 


Creatinine  0.93  (0.66-1.25)  mg/dL


 


Glucose  97  (74-99)  mg/dL


 


Calcium  9.6  (8.4-10.2)  mg/dL


 


AST  47  (17-59)  U/L


 


ALT  22  (4-49)  U/L


 


Alkaline Phosphatase  39  ()  U/L


 


Total Protein  6.8  (6.3-8.2)  g/dL


 


Albumin  4.4  (3.5-5.0)  g/dL








                               Current Medications











Generic Name Dose Route Start Last Admin





  Trade Name Freq  PRN Reason Stop Dose Admin


 


Aspirin  325 mg  09/14/22 09:00 





  Aspirin 325 Mg Tab  PO  





  DAILY [facility]  


 


Heparin Sodium (Porcine)  0 unit  09/13/22 19:04 





  Heparin Sodium 1,000 Un/Ml (10ml Vl)  IV  





  PER PROTOCOL PRN  





  Low PTT  





  Protocol  


 


Heparin Sodium/Sodium Chloride  250 mls @ 7.893 mls/hr  09/13/22 19:15  09/13/22

 21:03





  25,000 unit/ Sodium Chloride  IV   12 units/kg/hr





  .Q24H [facility]   7.893 mls/hr





    Administration





  Protocol  





  12 UNITS/KG/HR  


 


Sodium Chloride  1,000 mls @ 75 mls/hr  09/13/22 19:15  09/14/22 03:37





  Saline 0.9%  IV   75 mls/hr





  .[id number] [facility]   Administration


 


Metoprolol Tartrate  25 mg  09/14/22 09:00 





  Metoprolol Tartrate 25 Mg Tab  PO  





  BID [facility]  


 


Morphine Sulfate  4 mg  09/13/22 19:12 





  Morphine Sulfate 4 Mg/Ml Syringe  IV  





  Q5M PRN  





  Chest Pain  


 


Nitroglycerin  0.4 mg  09/13/22 19:12 





  Nitroglycerin Sl Tabs 0.4 Mg Tab  SUBLINGUAL  





  Q5M PRN  





  Chest Pain  








                                Intake and Output











 09/13/22 09/14/22 09/14/22





 22:59 06:59 14:59


 


Intake Total  222.358 


 


Balance  222.358 


 


Intake:   


 


  Intake, IV Titration  222.358 





  Amount   


 


    Heparin Sod,Pork in 0.45%  47.358 





    NaCl 25,000 unit In 0.45   





    % NaCl 1 250ml.bag @ 12   





    UNITS/KG/HR 7.893 mls/hr   





    IV .Q24H [facility] Rx#:   





    [id number]   


 


    Magnesium Sulfate-D5w Pmx  100 





    1 gm In Dextrose/Water 1   





    100ml.bag @ 100 mls/hr   





    IVPB ONCE ONE Rx#:   





    [id number]   


 


    Sodium Chloride 0.9% 1,  75 





    000 ml @ 75 mls/hr IV .   





    [id number] [facility] Rx#:[id number]   


 


  Oral  0 


 


Other:   


 


  # Voids  1 


 


  Weight 65.771 kg  








                                        





                                 09/13/22 16:52 





                                 09/13/22 16:52

## 2022-09-15 LAB
ALBUMIN SERPL-MCNC: 3.1 G/DL (ref 3.5–5)
ALP SERPL-CCNC: 32 U/L (ref 38–126)
ALT SERPL-CCNC: 18 U/L (ref 4–49)
ANION GAP SERPL CALC-SCNC: 5 MMOL/L
AST SERPL-CCNC: 41 U/L (ref 17–59)
BASOPHILS # BLD AUTO: 0.1 K/UL (ref 0–0.2)
BASOPHILS NFR BLD AUTO: 1 %
BUN SERPL-SCNC: 8 MG/DL (ref 9–20)
CALCIUM SPEC-MCNC: 8.3 MG/DL (ref 8.4–10.2)
CHLORIDE SERPL-SCNC: 108 MMOL/L (ref 98–107)
CHOLEST SERPL-MCNC: 168 MG/DL (ref 0–200)
CO2 SERPL-SCNC: 26 MMOL/L (ref 22–30)
EOSINOPHIL # BLD AUTO: 0.2 K/UL (ref 0–0.7)
EOSINOPHIL NFR BLD AUTO: 4 %
ERYTHROCYTE [DISTWIDTH] IN BLOOD BY AUTOMATED COUNT: 4.35 M/UL (ref 4.3–5.9)
ERYTHROCYTE [DISTWIDTH] IN BLOOD: 13.1 % (ref 11.5–15.5)
GLUCOSE SERPL-MCNC: 89 MG/DL (ref 74–99)
HCT VFR BLD AUTO: 46.8 % (ref 39–53)
HDLC SERPL-MCNC: 46 MG/DL (ref 40–60)
HGB BLD-MCNC: 15.2 GM/DL (ref 13–17.5)
LDLC SERPL CALC-MCNC: 98.6 MG/DL (ref 0–131)
LYMPHOCYTES # SPEC AUTO: 1.4 K/UL (ref 1–4.8)
LYMPHOCYTES NFR SPEC AUTO: 22 %
MCH RBC QN AUTO: 35 PG (ref 25–35)
MCHC RBC AUTO-ENTMCNC: 32.5 G/DL (ref 31–37)
MCV RBC AUTO: 107.6 FL (ref 80–100)
MONOCYTES # BLD AUTO: 0.4 K/UL (ref 0–1)
MONOCYTES NFR BLD AUTO: 6 %
NEUTROPHILS # BLD AUTO: 4.2 K/UL (ref 1.3–7.7)
NEUTROPHILS NFR BLD AUTO: 65 %
PLATELET # BLD AUTO: 205 K/UL (ref 150–450)
POTASSIUM SERPL-SCNC: 3.9 MMOL/L (ref 3.5–5.1)
PROT SERPL-MCNC: 5.1 G/DL (ref 6.3–8.2)
SODIUM SERPL-SCNC: 139 MMOL/L (ref 137–145)
TRIGL SERPL-MCNC: 117 MG/DL (ref 0–149)
VLDLC SERPL CALC-MCNC: 23.4 MG/DL (ref 5–40)
WBC # BLD AUTO: 6.4 K/UL (ref 3.8–10.6)

## 2022-09-15 PROCEDURE — 4A023N7 MEASUREMENT OF CARDIAC SAMPLING AND PRESSURE, LEFT HEART, PERCUTANEOUS APPROACH: ICD-10-PCS

## 2022-09-15 PROCEDURE — B2131ZZ FLUOROSCOPY OF MULTIPLE CORONARY ARTERY BYPASS GRAFTS USING LOW OSMOLAR CONTRAST: ICD-10-PCS

## 2022-09-15 PROCEDURE — B2111ZZ FLUOROSCOPY OF MULTIPLE CORONARY ARTERIES USING LOW OSMOLAR CONTRAST: ICD-10-PCS

## 2022-09-15 RX ADMIN — ASPIRIN 81 MG CHEWABLE TABLET SCH: 81 TABLET CHEWABLE at 08:52

## 2022-09-15 RX ADMIN — CEFAZOLIN SCH MLS/HR: 330 INJECTION, POWDER, FOR SOLUTION INTRAMUSCULAR; INTRAVENOUS at 04:25

## 2022-09-15 RX ADMIN — HEPARIN SODIUM SCH MLS/HR: 10000 INJECTION, SOLUTION INTRAVENOUS at 06:36

## 2022-09-15 RX ADMIN — METOPROLOL SUCCINATE SCH MG: 25 TABLET, EXTENDED RELEASE ORAL at 09:06

## 2022-09-15 RX ADMIN — PANTOPRAZOLE SODIUM SCH MG: 40 TABLET, DELAYED RELEASE ORAL at 06:28

## 2022-09-15 RX ADMIN — ATORVASTATIN CALCIUM SCH MG: 80 TABLET, FILM COATED ORAL at 09:06

## 2022-09-15 NOTE — P.PN
Subjective


Progress Note Date: 09/15/22





Patient is a pleasant 62-year-old male past medical history significant for 

coronary artery disease s/p CABG in 2006 (LIMA to LAD, VG to obtuse marginal 

branch of the LCx, VG to distal RCA/PDA), V fib arrest s/p ICD implantation in 

2020, hypertension, dyslipidemia, ischemic cardiomyopathy, congestive heart 

failure who presented to the ER because of multiple AICD shocks since yesterday.

 Patient was in usual state of health yesterday morning when he noticed that his

AICD fired after breakfast, it was followed by multiple frequent shocks.  During

these episodes patient did not have any lightheadedness or dizziness, denied any

chest pain or shortness of breath.  In total he had 5 shocks followed by another

2 shocks in the afternoon.  At that time his friend called EMS and he was 

brought to the ER of [facility].  Initial troponin was elevated at 

3.060, rest of blood work was normal, chest x-ray did not show any acute cardiac

process.  Patient was started on heparin and was admitted to hospitalist service

with cardiology consult.





9/15.  Patient seen and examined.  No further episodes of chest pain, no 

episodes of AICD firing in the last 24 hours.  Currently nothing by mouth going 

for cardiac cath today.  Vital signs stable.  case discussed with nursing staff





REVIEW OF SYSTEMS: 


CONSTITUTIONAL: No fever, no malaise, no fatigue. 


CARDIOVASCULAR: No chest pain, orthopnea, PND, no palpitations, no syncope. 


PULMONARY: No shortness of breath, no cough, no hemoptysis. 


GASTROINTESTINAL: No diarrhea, no nausea, no vomiting, no abdominal pain. 


MUSCULOSKELETAL/RHEUMATOLOGICAL: Denies any joint pain, swelling, or any muscle 

pain. 


ENDOCRINE: Denies any polyuria or polydipsia. 





PHYSICAL EXAMINATION: 





GENERAL: The patient is alert and oriented x3, not in any acute distress. Well 

developed, well nourished. 


HEENT: Pupils are round and equally reacting to light. EOMI. No scleral icterus.

No conjunctival pallor. Normocephalic, atraumatic. No pharyngeal erythema. No 

thyromegaly. 


CARDIOVASCULAR: S1 and S2 present. No murmurs, rubs, or gallops. 


PULMONARY: Chest is clear to auscultation, no wheezing or crackles. 


ABDOMEN: Soft, nontender, nondistended, normoactive bowel sounds. No palpable 

organomegaly. 


MUSCULOSKELETAL: No joint swelling or deformity.


EXTREMITIES: No cyanosis, clubbing, or pedal edema. 


NEUROLOGICAL: Gross neurological examination did not reveal any focal deficits. 


SKIN: No rashes. 





Assessment 


Multiple AICD shocks


NSTEMI


Coronary artery disease s/p CABG in 2006 (LIMA to LAD, VG to obtuse marginal 

branch of the LCx, VG to distal RCA/PDA)


History of V fib arrest s/p ICD implantation in 2020


Hypertension


Dyslipidemia


Ischemic cardiomyopathy


Chronic congestive heart failure with reduced ejection fraction 





Plan;


Monitor electrolytes.


Moderate renal functions


Continue to trend troponins.


Continue pharmacy to dose heparin


Device was interrogated that revealed V fib and Vtach with appropriate shocks 


Echocardiogram revealed an EF of 2025 percent


Continue aspirin, Lipitor, Toprol


Currently nothing by mouth, going for cardiac catheterization today


Follow up in cardiology recommendations





DVT prophylaxis: Heparin





Objective





- Vital Signs


Vital signs: 


                                   Vital Signs











Temp  97.9 F   09/15/22 12:09


 


Pulse  54 L  09/15/22 10:42


 


Resp  14   09/15/22 12:09


 


BP  117/70   09/15/22 12:09


 


Pulse Ox  95   09/15/22 12:09


 


FiO2      








                                 Intake & Output











 09/14/22 09/15/22 09/15/22





 18:59 06:59 18:59


 


Intake Total 1200 250 136.702


 


Output Total   200


 


Balance 1200 250 -63.298


 


Intake:   


 


  IV   100


 


  Intake, IV Titration  250 36.702





  Amount   


 


    Heparin Sod,Pork in 0.45%  250 36.702





    NaCl 25,000 unit In 0.45   





    % NaCl 1 250ml.bag @ 12   





    UNITS/KG/HR 7.893 mls/hr   





    IV .Q24H [facility] Rx#:   





    [id number]   


 


  Oral 1200  


 


Output:   


 


  Urine   200


 


Other:   


 


  Voiding Method Toilet Toilet Toilet


 


  # Voids 2 2 














- Labs


CBC & Chem 7: 


                                 09/15/22 08:12





                                 09/15/22 08:12


Labs: 


                  Abnormal Lab Results - Last 24 Hours (Table)











  09/15/22 09/15/22 09/15/22 Range/Units





  08:12 08:12 08:12 


 


MCV  107.6 H    (80.0-100.0)  fL


 


APTT    34.6 H  (22.0-30.0)  sec


 


Chloride   108 H   ()  mmol/L


 


BUN   8 L   (9-20)  mg/dL


 


Calcium   8.3 L   (8.4-10.2)  mg/dL


 


Alkaline Phosphatase   32 L   ()  U/L


 


Total Protein   5.1 L   (6.3-8.2)  g/dL


 


Albumin   3.1 L   (3.5-5.0)  g/dL

## 2022-09-15 NOTE — CC
CARDIAC CATHETERIZATION REPORT



INDICATIONS FOR PROCEDURE:

A 62-year-old gentleman with history of coronary artery disease, status post CABG with

LIMA to LAD, venous graft to PDA and OM, who presented to [facility]

following AICD discharge for ventricular tachycardia and fibrillation.  His troponins

were elevated, due to which, I was asked to perform cardiac catheterization.



PROCEDURE NOTE:

After obtaining informed consent, left heart catheterization, coronary angiogram, and

selective injection of the bypass grafts have been performed via the right femoral

artery using standard Tanesha catheters.  The patient tolerated the procedure well

without any obvious immediate complications.  The patient received moderate conscious

sedation.  Total sedation time was 19 minutes.



FINDINGS:

1. HEMODYNAMICS:  Left ventricular end-diastolic pressure is 18 mmHg.  There is no

    significant gradient across the aortic valve.

2. LEFT VENTRICULOGRAM:  Left ventriculogram is not performed.

3. ANGIOGRAPHIC DATA:

    a.Right coronary artery:  Right coronary artery is totally occluded in its proximal

     portion.

    b.Left main coronary artery is a normal-sized vessel and is free of stenosis.

     Divides into left anterior descending coronary artery and circumflex coronary

     artery.  LAD is totally occluded proximally.  Circumflex coronary artery shows a

     focal 90% stenosis.

4. SELECTIVE INJECTION OF THE BYPASS GRAFTS:

    a.LIMA to LAD appears patent.  Proximal and distal anastomotic sites are free of

     disease.  The body of the graft appears normal.

    b.Venous graft to PDA appears patent.  Proximal and distal anastomotic sites are

     normal.  Native right coronary artery is free of significant disease, and the

     body of the graft looks normal.

    c.      Venous graft to the OM branch appears patent.



CONCLUSIONS:

1. Severe 3-vessel coronary artery disease as described above.

2. Patent MONTGOMERY to LAD, patent venous graft to OM, patent venous graft to right

    coronary artery.



PLAN:

The patient's management is going to be in the form of medical therapy.  His non-STEMI

could be related to the ventricular tachycardia and fibrillation.  I advised the

patient regular followup through my office upon discharge.





MMODL / IJN: [id number] / Job#: [id number]

## 2022-09-15 NOTE — P.PN
Subjective


This is a pleasant 62-year-old male past medical history significant for 

coronary artery disease s/p CABG in 2006 (LIMA to LAD, VG to obtuse marginal 

branch of the LCx, VG to distal RCA/PDA), V fib arrest s/p ICD implantation in 

2020, hypertension, dyslipidemia, ischemic cardiomyopathy, congestive heart 

failure .  He follows in the office with Dr. Clayton. We have been asked to see in

consultation for tachyarrhythmia. Patient presents emergency department after 

multiple ICD shocks. Patient states he is moving to [place] and was packing at

home. He was sitting down and all of a sudden felt shock in his chest.  He did 

endorse some lightheadedness in the morning.  Prior to the shock he denies any 

chest pain, palpitations, shortness of breath, lightheadedness or dizziness. He 

states he felt his ICD shock him multiple times. He denies any recent changes to

his medications or medical history. He continues to smoke 1PPD. 





9/15/2022


Patient seen and examined at bedside, he is overall doing well. No complaints. 

No chest pain or shortness of breath. Device was interrogated that revealed V 

fib and Vtach with appropriate shocks 


Echocardiogram revealed an EF of 2025 percent


He's currently maintained on aspirin  81mg daily, atorvastatin 80 mg daily, IV 

heparin, metoprolol succinate 12.5 mg daily








PHYSICAL EXAMINATION


Blood pressure 130/60, heart rate 48, afebrile, saturations 95% room air


CONSTITUTIONAL: No apparent distress. 


HEENT: Head is normocephalic. Pupils are equal, round. Sclerae anicteric. Mucous

membranes of the mouth are moist.  No JVD. No carotid bruit.


CHEST EXAMINATION: Lungs are clear to auscultation. No chest wall tenderness is 

noted on palpation or with deep breathing. 


HEART EXAMINATION: Regular rate and rhythm. S1, S2 heard. Systolic murmur at 

base.


ABDOMEN: Soft, nontender. Positive bowel sounds.


EXTREMITIES: 2+ peripheral pulses, no lower extremity edema and no calf 

tenderness.


NEUROLOGIC EXAMINATION: Patient is awake, alert and oriented x3. 





ASSESSMENT


V fib and Vtach with appropriate ICD shocks


NSVT


NSTEMI


Coronary artery disease s/p CABG in 2006 (LIMA to LAD, VG to obtuse marginal 

branch of the LCx, VG to distal RCA/PDA)


History of V fib arrest s/p ICD implantation in 2020


Hypertension


Dyslipidemia


Ischemic cardiomyopathy


Chronic congestive heart failure with reduced ejection fraction 





PLAN


Plan for cardiac catheterization with Dr. Clayton today, patient is agreeable. 


Continue Aspirin, statin, beta blocker


I have discussed the risks, benefits and alternative therapies for the 

above-mentioned procedure and for both sedation/analgesia as well as necessary 

blood product administration, if indicated, as they pertain to this patient.  

The patient has indicated understanding and acceptance of the risks and 

procedures discussed.  Questions have been answered appropriately and he is 

agreeable to move forward with the above-stated procedure. 


Further recommendations and clinical course





Nurse practitioner note has been reviewed by physician. Signing provider agrees 

with the documented findings, assessment, and plan of care. 











Objective





- Vital Signs


Vital signs: 


                                   Vital Signs











Temp  97.2 F L  09/15/22 08:00


 


Pulse  48 L  09/15/22 08:00


 


Resp  14   09/15/22 08:00


 


BP  113/68   09/15/22 08:00


 


Pulse Ox  95   09/15/22 08:00


 


FiO2      








                                 Intake & Output











 09/14/22 09/15/22 09/15/22





 18:59 06:59 18:59


 


Intake Total 1200 250 


 


Balance 1200 250 


 


Intake:   


 


  Intake, IV Titration  250 





  Amount   


 


    Heparin Sod,Pork in 0.45%  250 





    NaCl 25,000 unit In 0.45   





    % NaCl 1 250ml.bag @ 12   





    UNITS/KG/HR 7.893 mls/hr   





    IV .Q24H [facility] Rx#:   





    [id number]   


 


  Oral 1200  


 


Other:   


 


  Voiding Method Toilet Toilet Toilet


 


  # Voids 2 2 














- Labs


CBC & Chem 7: 


                                 09/15/22 08:12





                                 09/15/22 08:12


Labs: 


                  Abnormal Lab Results - Last 24 Hours (Table)











  09/15/22 09/15/22 09/15/22 Range/Units





  08:12 08:12 08:12 


 


MCV  107.6 H    (80.0-100.0)  fL


 


APTT    34.6 H  (22.0-30.0)  sec


 


Chloride   108 H   ()  mmol/L


 


BUN   8 L   (9-20)  mg/dL


 


Calcium   8.3 L   (8.4-10.2)  mg/dL


 


Alkaline Phosphatase   32 L   ()  U/L


 


Total Protein   5.1 L   (6.3-8.2)  g/dL


 


Albumin   3.1 L   (3.5-5.0)  g/dL

## 2022-09-16 VITALS
HEART RATE: 58 BPM | DIASTOLIC BLOOD PRESSURE: 71 MMHG | RESPIRATION RATE: 14 BRPM | SYSTOLIC BLOOD PRESSURE: 110 MMHG | TEMPERATURE: 97 F

## 2022-09-16 LAB
ANION GAP SERPL CALC-SCNC: 4 MMOL/L
BASOPHILS # BLD AUTO: 0.1 K/UL (ref 0–0.2)
BASOPHILS NFR BLD AUTO: 1 %
BUN SERPL-SCNC: 8 MG/DL (ref 9–20)
CALCIUM SPEC-MCNC: 8.7 MG/DL (ref 8.4–10.2)
CHLORIDE SERPL-SCNC: 105 MMOL/L (ref 98–107)
CO2 SERPL-SCNC: 29 MMOL/L (ref 22–30)
EOSINOPHIL # BLD AUTO: 0.4 K/UL (ref 0–0.7)
EOSINOPHIL NFR BLD AUTO: 5 %
ERYTHROCYTE [DISTWIDTH] IN BLOOD BY AUTOMATED COUNT: 4.36 M/UL (ref 4.3–5.9)
ERYTHROCYTE [DISTWIDTH] IN BLOOD: 12.5 % (ref 11.5–15.5)
GLUCOSE SERPL-MCNC: 86 MG/DL (ref 74–99)
HCT VFR BLD AUTO: 46 % (ref 39–53)
HGB BLD-MCNC: 15.2 GM/DL (ref 13–17.5)
LYMPHOCYTES # SPEC AUTO: 1.5 K/UL (ref 1–4.8)
LYMPHOCYTES NFR SPEC AUTO: 20 %
MCH RBC QN AUTO: 34.8 PG (ref 25–35)
MCHC RBC AUTO-ENTMCNC: 33 G/DL (ref 31–37)
MCV RBC AUTO: 105.4 FL (ref 80–100)
MONOCYTES # BLD AUTO: 0.5 K/UL (ref 0–1)
MONOCYTES NFR BLD AUTO: 7 %
NEUTROPHILS # BLD AUTO: 4.8 K/UL (ref 1.3–7.7)
NEUTROPHILS NFR BLD AUTO: 65 %
PLATELET # BLD AUTO: 182 K/UL (ref 150–450)
POTASSIUM SERPL-SCNC: 3.8 MMOL/L (ref 3.5–5.1)
SODIUM SERPL-SCNC: 138 MMOL/L (ref 137–145)
WBC # BLD AUTO: 7.5 K/UL (ref 3.8–10.6)

## 2022-09-16 RX ADMIN — ASPIRIN 81 MG CHEWABLE TABLET SCH MG: 81 TABLET CHEWABLE at 09:05

## 2022-09-16 RX ADMIN — ATORVASTATIN CALCIUM SCH MG: 80 TABLET, FILM COATED ORAL at 09:06

## 2022-09-16 RX ADMIN — PANTOPRAZOLE SODIUM SCH MG: 40 TABLET, DELAYED RELEASE ORAL at 06:47

## 2022-09-16 RX ADMIN — METOPROLOL SUCCINATE SCH MG: 25 TABLET, EXTENDED RELEASE ORAL at 09:05

## 2022-09-16 NOTE — P.PN
Subjective


This is a pleasant 62-year-old male past medical history significant for 

coronary artery disease s/p CABG in 2006 (LIMA to LAD, VG to obtuse marginal 

branch of the LCx, VG to distal RCA/PDA), V fib arrest s/p ICD implantation in 

2020, hypertension, dyslipidemia, ischemic cardiomyopathy, congestive heart 

failure .  He follows in the office with Dr. Clayton. We have been asked to see in

consultation for tachyarrhythmia. Patient presents emergency department after 

multiple ICD shocks. Patient states he is moving to [place] and was packing at

home. He was sitting down and all of a sudden felt shock in his chest.  He did 

endorse some lightheadedness in the morning.  Prior to the shock he denies any 

chest pain, palpitations, shortness of breath, lightheadedness or dizziness. He 

states he felt his ICD shock him multiple times. He denies any recent changes to

his medications or medical history. He continues to smoke 1PPD. 





9/15/2022


Patient seen and examined at bedside, he is overall doing well. No complaints. 

No chest pain or shortness of breath. Device was interrogated that revealed V 

fib and Vtach with appropriate shocks 


Echocardiogram revealed an EF of 2025 percent


He's currently maintained on aspirin  81mg daily, atorvastatin 80 mg daily, IV 

heparin, metoprolol succinate 12.5 mg daily





Patient underwent cardiac catheterization with Dr. Clayton which revealed severe 

three-vessel coronary artery disease, patent LIMA to LAD, patent venous graft to

OM, previous graft to RCA.  RCA is totally occluded in the proximal portion, LAD

is totally occluded proximally, left circumflex coronary artery shows a focal 

90% stenosis. 





9/16


Patient seen and examined at bedside, feeling well.  Denies any chest pain or 

shortness of breath.  Vital signs are stable.  








PHYSICAL EXAMINATION


Blood pressure 130/60, heart rate 48, afebrile, saturations 95% room air


CONSTITUTIONAL: No apparent distress. 


HEENT: Neck supple, no JVD


CHEST EXAMINATION: Lungs are clear to auscultation. No chest wall tenderness is 

noted on palpation or with deep breathing. 


HEART EXAMINATION: Regular rate and rhythm. S1, S2 heard. Systolic murmur at 

base.


ABDOMEN: Soft, nontender. Positive bowel sounds.


EXTREMITIES: 2+ peripheral pulses, no lower extremity edema and no calf 

tenderness.


SKIN: Right groin site clean, dry, no hematoma.  2+ peripheral pulses


NEUROLOGIC EXAMINATION: Patient is awake, alert and oriented x3. 





ASSESSMENT


V fib and Vtach with appropriate ICD shocks


Status post cardiac catheterization with patent LIMA to LAD, patent venous graft

to OM, previous graft to RCA


NSVT


Rule out NSTEMI


Coronary artery disease s/p CABG in 2006 (LIMA to LAD, VG to obtuse marginal 

branch of the LCx, VG to distal RCA/PDA)


History of V fib arrest s/p ICD implantation in 2020


Hypertension


Dyslipidemia


Ischemic cardiomyopathy


Chronic congestive heart failure with reduced ejection fraction 





PLAN


Start amiodarone taper, start 400mg BID today


Continue aspirin, statin, lisinopril, atorvastatin, spironolactone, beta blocker


From cardiology perspective, patient stable for discharged home today.  Follow 

up outpatient with Dr. Clayton in one week





Nurse practitioner note has been reviewed by physician. Signing provider agrees 

with the documented findings, assessment, and plan of care. 











Objective





- Vital Signs


Vital signs: 


                                   Vital Signs











Temp  97.0 F L  09/16/22 08:00


 


Pulse  58 L  09/16/22 08:00


 


Resp  14   09/16/22 08:00


 


BP  110/71   09/16/22 08:00


 


Pulse Ox  96   09/16/22 08:00


 


FiO2      








                                 Intake & Output











 09/15/22 09/16/22 09/16/22





 18:59 06:59 18:59


 


Intake Total 136.702  118


 


Output Total 200  


 


Balance -63.298  118


 


Intake:   


 


    


 


  Intake, IV Titration 36.702  





  Amount   


 


    Heparin Sod,Pork in 0.45% 36.702  





    NaCl 25,000 unit In 0.45   





    % NaCl 1 250ml.bag @ 12   





    UNITS/KG/HR 7.893 mls/hr   





    IV .Q24H [facility] Rx#:   





    [id number]   


 


  Oral   118


 


Output:   


 


  Urine 200  


 


Other:   


 


  Voiding Method Toilet Toilet Toilet


 


  # Voids 1 2 














- Labs


CBC & Chem 7: 


                                 09/16/22 06:43





                                 09/16/22 06:43


Labs: 


                  Abnormal Lab Results - Last 24 Hours (Table)











  09/16/22 09/16/22 Range/Units





  06:43 06:43 


 


MCV  105.4 H   (80.0-100.0)  fL


 


BUN   8 L  (9-20)  mg/dL

## 2022-09-18 NOTE — P.DS
Providers


Date of admission: 


09/13/22 19:12





Expected date of discharge: 09/16/22


Attending physician: 


Last Momin





Consults: 





                                        





09/13/22 19:12


Consult Physician Urgent 


   Consulting Provider: Allegra Toney


   Consult Reason/Comments: Tachyarrhythmia.


   Do you want consulting provider notified?: Already Contacted











Primary care physician: 


[facility] Course: 





62-year-old male past medical history significant for coronary artery disease 

s/p CABG in 2006 (LIMA to LAD, VG to obtuse marginal branch of the LCx, VG to 

distal RCA/PDA), V fib arrest s/p ICD implantation in 2020, hypertension, 

dyslipidemia, ischemic cardiomyopathy, congestive heart failure who presented to

the ER because of multiple AICD shocks since yesterday.  Patient was in usual 

state of health yesterday morning when he noticed that his AICD fired after 

breakfast, it was followed by multiple frequent shocks.  During these episodes 

patient did not have any lightheadedness or dizziness, denied any chest pain or 

shortness of breath.  In total he had 5 shocks followed by another 2 shocks in 

the afternoon.  At that time his friend called EMS and he was brought to the ER 

of [facility].  Initial troponin was elevated at 3.060, rest of blood 

work was normal, chest x-ray did not show any acute cardiac process.  Patient 

was started on heparin and was admitted to hospitalist service with cardiology 

consult.





Multiple AICD shocks


NSTEMI


Coronary artery disease s/p CABG in 2006 (LIMA to LAD, VG to obtuse marginal 

branch of the LCx, VG to distal RCA/PDA)


History of V fib arrest s/p ICD implantation in 2020


Hypertension


Dyslipidemia


Ischemic cardiomyopathy


Chronic congestive heart failure with reduced ejection fraction 





Plan;


Monitor electrolytes.


Moderate renal functions


Continue to trend troponins.


Continue pharmacy to dose heparin


Device was interrogated that revealed V fib and Vtach with appropriate shocks 


Echocardiogram revealed an EF of 2025 percent


Continue aspirin, Lipitor, Toprol


Currently nothing by mouth, going for cardiac catheterization today


Follow up in cardiology recommendations





DVT prophylaxis: Heparin








9/15.  Patient seen and examined.  No further episodes of chest pain, no 

episodes of AICD firing in the last 24 hours.  Currently nothing by mouth going 

for cardiac cath today.  Vital signs stable.  case discussed with nursing staff





9/16/2022


Start amiodarone taper, start 400mg BID today


Continue aspirin, statin, lisinopril, atorvastatin, spironolactone, beta blocker


From cardiology perspective, patient stable for discharged home today.  Follow 

up outpatient with Dr. Clayton in one week


Patient Condition at Discharge: Serious





Plan - Discharge Summary


Discharge Rx Participant: Yes


New Discharge Prescriptions: 


New


   Spironolactone [Aldactone] 25 mg PO DAILY #90 tab


   Aspirin 81 mg PO DAILY #90 tab


   Amiodarone [Cordarone] 400 mg PO BID #120 tab


   Nitroglycerin Sl Tabs [Nitrostat] 0.4 mg SUBLINGUAL Q5M PRN #25 tab


     PRN Reason: Chest Pain


   lisinopriL [Zestril] 2.5 mg PO DAILY #90 tab


   Atorvastatin [Lipitor] 80 mg PO DAILY #90 tab


   Metoprolol Succinate (ER) [Toprol XL] 12.5 mg PO DAILY #60 tab





Continue


   Omeprazole [PriLOSEC] 40 mg PO DAILY





Discontinued


   Metoprolol Succinate (ER) [Toprol Xl] 25 mg PO DAILY


   Atorvastatin [Lipitor] 40 mg PO DAILY


Discharge Medication List





Omeprazole [PriLOSEC] 40 mg PO DAILY 09/13/22 [History]


Amiodarone [Cordarone] 400 mg PO BID #120 tab 09/16/22 [Rx]


Aspirin 81 mg PO DAILY #90 tab 09/16/22 [Rx]


Atorvastatin [Lipitor] 80 mg PO DAILY #90 tab 09/16/22 [Rx]


Metoprolol Succinate (ER) [Toprol XL] 12.5 mg PO DAILY #60 tab 09/16/22 [Rx]


Nitroglycerin Sl Tabs [Nitrostat] 0.4 mg SUBLINGUAL Q5M PRN #25 tab 09/16/22 

[Rx]


Spironolactone [Aldactone] 25 mg PO DAILY #90 tab 09/16/22 [Rx]


lisinopriL [Zestril] 2.5 mg PO DAILY #90 tab 09/16/22 [Rx]








Follow up Appointment(s)/Referral(s): 


None,Stated [REFERRING] - 1-2 days


Florentin Clayton MD [STAFF PHYSICIAN] - 09/23/22 1:30 pm


Activity/Diet/Wound Care/Special Instructions: 


Please follow up with Dr. Clayton in 1 week. 


Please take your medications as prescribed. 


Amiodarone dose needs to be reduced over time. 





Amiodarone Instructions:


Take 400mg Twice a day for 1 week (9/16-9/22)


Take 200mg Twice a day for 1 week (9/23-9/29)


Then take 200mg daily starting 9/30


Further changes by your cardiologist Dr. Clayton


Discharge Disposition: HOME SELF-CARE